# Patient Record
Sex: MALE | URBAN - METROPOLITAN AREA
[De-identification: names, ages, dates, MRNs, and addresses within clinical notes are randomized per-mention and may not be internally consistent; named-entity substitution may affect disease eponyms.]

---

## 2021-10-07 ENCOUNTER — OFFICE VISIT (OUTPATIENT)
Dept: URBAN - METROPOLITAN AREA CLINIC 98 | Facility: CLINIC | Age: 61
End: 2021-10-07

## 2021-10-07 NOTE — HPI-TODAY'S VISIT:
Colonoscopy was done May 2018.  Hemorrhoids were found and banding was performed. Additionally anal condyloma were found and patient was referred to colorectal surgery.

## 2021-11-02 ENCOUNTER — OFFICE VISIT (OUTPATIENT)
Dept: URBAN - METROPOLITAN AREA CLINIC 98 | Facility: CLINIC | Age: 61
End: 2021-11-02

## 2023-06-21 ENCOUNTER — OFFICE VISIT (OUTPATIENT)
Dept: URBAN - METROPOLITAN AREA CLINIC 27 | Facility: CLINIC | Age: 63
End: 2023-06-21

## 2024-12-11 ENCOUNTER — OFFICE VISIT (OUTPATIENT)
Dept: URBAN - METROPOLITAN AREA CLINIC 98 | Facility: CLINIC | Age: 64
End: 2024-12-11

## 2025-01-03 ENCOUNTER — LAB OUTSIDE AN ENCOUNTER (OUTPATIENT)
Dept: URBAN - METROPOLITAN AREA CLINIC 105 | Facility: CLINIC | Age: 65
End: 2025-01-03

## 2025-01-03 ENCOUNTER — OFFICE VISIT (OUTPATIENT)
Dept: URBAN - METROPOLITAN AREA CLINIC 105 | Facility: CLINIC | Age: 65
End: 2025-01-03
Payer: COMMERCIAL

## 2025-01-03 ENCOUNTER — DASHBOARD ENCOUNTERS (OUTPATIENT)
Age: 65
End: 2025-01-03

## 2025-01-03 ENCOUNTER — TELEPHONE ENCOUNTER (OUTPATIENT)
Dept: URBAN - METROPOLITAN AREA CLINIC 6 | Facility: CLINIC | Age: 65
End: 2025-01-03

## 2025-01-03 VITALS
SYSTOLIC BLOOD PRESSURE: 161 MMHG | BODY MASS INDEX: 26.51 KG/M2 | HEIGHT: 69 IN | DIASTOLIC BLOOD PRESSURE: 88 MMHG | TEMPERATURE: 96.8 F | HEART RATE: 72 BPM | WEIGHT: 179 LBS

## 2025-01-03 DIAGNOSIS — Z12.11 SCREEN FOR COLON CANCER: ICD-10-CM

## 2025-01-03 DIAGNOSIS — K80.20 GALLSTONES: ICD-10-CM

## 2025-01-03 DIAGNOSIS — A63.0 CONDYLOMA ACUMINATUM OF ANUS: ICD-10-CM

## 2025-01-03 PROBLEM — 235919008: Status: ACTIVE | Noted: 2025-01-03

## 2025-01-03 PROBLEM — 240597001: Status: ACTIVE | Noted: 2025-01-03

## 2025-01-03 PROCEDURE — 99204 OFFICE O/P NEW MOD 45 MIN: CPT | Performed by: INTERNAL MEDICINE

## 2025-01-03 RX ORDER — TIZANIDINE 4 MG/1
TABLET ORAL
Qty: 30 TABLET | Status: ACTIVE | COMMUNITY

## 2025-01-03 NOTE — HPI-TODAY'S VISIT:
1/3/25 63 yo male pt of DR Jose Jarvis.  Here to schedule colonoscopy Had anal condyloma with AIN 2016 and 2018 Has regular BMs. no blood in stool. no fhx of colon CA. does not have a CRS but thinks he has seen one in the past.  CT 6/2023 showed gallstones and some GB wall thickening and mild IHD . Says since then started eating beets and has no abd pain anymore. Used to have some abd discomfort RUQ prior to this. Says he has hemorrhoids but no symptoms at this time except some occ protrusion. Wants hemorrhoid banding for this reason.

## 2025-01-04 LAB
ALBUMIN/GLOBULIN RATIO: 1.3
ALBUMIN: 4.3
ALKALINE PHOSPHATASE: 119
ALT (SGPT): 47
AST (SGOT): 37
BILIRUBIN, DIRECT: 0.1
BILIRUBIN, INDIRECT: 0.6
BILIRUBIN, TOTAL: 0.7
GLOBULIN: 3.2
PROTEIN, TOTAL: 7.5

## 2025-01-06 ENCOUNTER — CLAIMS CREATED FROM THE CLAIM WINDOW (OUTPATIENT)
Dept: URBAN - METROPOLITAN AREA SURGERY CENTER 16 | Facility: SURGERY CENTER | Age: 65
End: 2025-01-06
Payer: COMMERCIAL

## 2025-01-06 ENCOUNTER — TELEPHONE ENCOUNTER (OUTPATIENT)
Dept: URBAN - METROPOLITAN AREA CLINIC 105 | Facility: CLINIC | Age: 65
End: 2025-01-06

## 2025-01-06 ENCOUNTER — LAB OUTSIDE AN ENCOUNTER (OUTPATIENT)
Dept: URBAN - METROPOLITAN AREA CLINIC 105 | Facility: CLINIC | Age: 65
End: 2025-01-06

## 2025-01-06 ENCOUNTER — OFFICE VISIT (OUTPATIENT)
Dept: URBAN - METROPOLITAN AREA SURGERY CENTER 16 | Facility: SURGERY CENTER | Age: 65
End: 2025-01-06

## 2025-01-06 DIAGNOSIS — Z12.11 COLON CANCER SCREENING: ICD-10-CM

## 2025-01-06 DIAGNOSIS — Z53.8 FAILED ATTEMPTED SURGICAL PROCEDURE: ICD-10-CM

## 2025-01-06 PROCEDURE — 00812 ANES LWR INTST SCR COLSC: CPT | Performed by: ANESTHESIOLOGY

## 2025-01-06 PROCEDURE — 00812 ANES LWR INTST SCR COLSC: CPT | Performed by: ANESTHESIOLOGIST ASSISTANT

## 2025-01-06 RX ORDER — TIZANIDINE 4 MG/1
TABLET ORAL
Qty: 30 TABLET | Status: ACTIVE | COMMUNITY

## 2025-01-15 ENCOUNTER — OFFICE VISIT (OUTPATIENT)
Dept: URBAN - METROPOLITAN AREA SURGERY CENTER 16 | Facility: SURGERY CENTER | Age: 65
End: 2025-01-15

## 2025-02-04 ENCOUNTER — TELEPHONE ENCOUNTER (OUTPATIENT)
Dept: URBAN - METROPOLITAN AREA CLINIC 23 | Facility: CLINIC | Age: 65
End: 2025-02-04

## 2025-02-05 ENCOUNTER — TELEPHONE ENCOUNTER (OUTPATIENT)
Dept: URBAN - METROPOLITAN AREA CLINIC 105 | Facility: CLINIC | Age: 65
End: 2025-02-05

## 2025-02-05 ENCOUNTER — OFFICE VISIT (OUTPATIENT)
Dept: URBAN - METROPOLITAN AREA SURGERY CENTER 16 | Facility: SURGERY CENTER | Age: 65
End: 2025-02-05

## 2025-02-05 RX ORDER — TIZANIDINE 4 MG/1
TABLET ORAL
Qty: 30 TABLET | Status: ACTIVE | COMMUNITY

## 2025-02-13 ENCOUNTER — OFFICE VISIT (OUTPATIENT)
Dept: URBAN - METROPOLITAN AREA SURGERY CENTER 16 | Facility: SURGERY CENTER | Age: 65
End: 2025-02-13

## 2025-02-19 ENCOUNTER — OFFICE VISIT (OUTPATIENT)
Dept: URBAN - METROPOLITAN AREA SURGERY CENTER 16 | Facility: SURGERY CENTER | Age: 65
End: 2025-02-19

## 2025-02-21 ENCOUNTER — OFFICE VISIT (OUTPATIENT)
Dept: URBAN - METROPOLITAN AREA CLINIC 105 | Facility: CLINIC | Age: 65
End: 2025-02-21

## 2025-02-21 RX ORDER — TIZANIDINE 4 MG/1
TABLET ORAL
Qty: 30 TABLET | Status: ACTIVE | COMMUNITY

## 2025-02-24 ENCOUNTER — TELEPHONE ENCOUNTER (OUTPATIENT)
Dept: URBAN - METROPOLITAN AREA CLINIC 105 | Facility: CLINIC | Age: 65
End: 2025-02-24

## 2025-04-18 ENCOUNTER — OFFICE VISIT (OUTPATIENT)
Dept: URBAN - METROPOLITAN AREA CLINIC 105 | Facility: CLINIC | Age: 65
End: 2025-04-18
Payer: COMMERCIAL

## 2025-04-18 ENCOUNTER — LAB OUTSIDE AN ENCOUNTER (OUTPATIENT)
Dept: URBAN - METROPOLITAN AREA CLINIC 105 | Facility: CLINIC | Age: 65
End: 2025-04-18

## 2025-04-18 DIAGNOSIS — A63.0 CONDYLOMA ACUMINATUM OF ANUS: ICD-10-CM

## 2025-04-18 DIAGNOSIS — Z12.11 SCREEN FOR COLON CANCER: ICD-10-CM

## 2025-04-18 DIAGNOSIS — R74.01 ELEVATED TRANSAMINASE LEVEL: ICD-10-CM

## 2025-04-18 DIAGNOSIS — K80.20 GALLSTONES: ICD-10-CM

## 2025-04-18 PROCEDURE — 99214 OFFICE O/P EST MOD 30 MIN: CPT | Performed by: INTERNAL MEDICINE

## 2025-04-18 RX ORDER — OXYCODONE AND ACETAMINOPHEN 5; 325 MG/1; MG/1
TABLET ORAL
Qty: 10 TABLET | Status: ACTIVE | COMMUNITY

## 2025-04-18 NOTE — HPI-TODAY'S VISIT:
1/3/25 63 yo male pt of DR Jose Jarvis.  Here to schedule colonoscopy Had anal condyloma with AIN 2016 and 2018 Has regular BMs. no blood in stool. no fhx of colon CA. does not have a CRS but thinks he has seen one in the past.  CT 6/2023 showed gallstones and some GB wall thickening and mild IHD . Says since then started eating beets and has no abd pain anymore. Used to have some abd discomfort RUQ prior to this. Says he has hemorrhoids but no symptoms at this time except some occ protrusion. Wants hemorrhoid banding for this reason.  4/18/25 Here for f.u Has seen Dr Roque Chang - had hemorrhoid banding.  US showing gallstones - says occ twinges in RUQ, persistently present. "its only been bad once a few years ago" Twinges are not necessarily worse with food.  Works out a lot but pain is not on the opposite Has regular BMs.  Poor prep at colonoscopy - ate chicken the day before.

## 2025-04-23 LAB
ACTIN (SMOOTH MUSCLE) ANTIBODY (IGG): <20
ALBUMIN/GLOBULIN RATIO: 1.2
ALBUMIN: 4
ALKALINE PHOSPHATASE: 110
ALPHA-1-ANTITRYPSIN QN: 155
ALT: 27
AST: 26
BILIRUBIN, DIRECT: 0.2
BILIRUBIN, INDIRECT: 0.6
BILIRUBIN, TOTAL: 0.8
CERULOPLASMIN: 36
ENHANCED LIVER FIBROSIS (ELF) SCORE: 9.43
FERRITIN, SERUM: 31
FIB 4 INDEX: 1.31
GLOBULIN: 3.4
HBSAG SCREEN: (no result)
HEP A AB, IGM: (no result)
HEP B CORE AB, IGM: (no result)
HEPATITIS C ANTIBODY: (no result)
IRON BIND.CAP.(TIBC): 341
IRON SATURATION: 26
IRON: 90
MITOCHONDRIAL AB SCREEN: NEGATIVE
PLATELET COUNT: 249
PROTEIN, TOTAL: 7.4

## 2025-05-06 ENCOUNTER — TELEPHONE ENCOUNTER (OUTPATIENT)
Dept: URBAN - METROPOLITAN AREA CLINIC 105 | Facility: CLINIC | Age: 65
End: 2025-05-06

## 2025-05-07 ENCOUNTER — OFFICE VISIT (OUTPATIENT)
Dept: URBAN - METROPOLITAN AREA SURGERY CENTER 16 | Facility: SURGERY CENTER | Age: 65
End: 2025-05-07

## 2025-05-16 ENCOUNTER — OFFICE VISIT (OUTPATIENT)
Dept: URBAN - METROPOLITAN AREA CLINIC 105 | Facility: CLINIC | Age: 65
End: 2025-05-16

## 2025-06-04 ENCOUNTER — OFFICE VISIT (OUTPATIENT)
Dept: URBAN - METROPOLITAN AREA CLINIC 105 | Facility: CLINIC | Age: 65
End: 2025-06-04
Payer: COMMERCIAL

## 2025-06-04 DIAGNOSIS — R74.01 ELEVATED TRANSAMINASE LEVEL: ICD-10-CM

## 2025-06-04 DIAGNOSIS — K80.20 GALLSTONES: ICD-10-CM

## 2025-06-04 DIAGNOSIS — A63.0 CONDYLOMA ACUMINATUM OF ANUS: ICD-10-CM

## 2025-06-04 DIAGNOSIS — Z12.11 SCREEN FOR COLON CANCER: ICD-10-CM

## 2025-06-04 PROCEDURE — 99213 OFFICE O/P EST LOW 20 MIN: CPT | Performed by: INTERNAL MEDICINE

## 2025-06-04 RX ORDER — OXYCODONE AND ACETAMINOPHEN 5; 325 MG/1; MG/1
TABLET ORAL
Qty: 10 TABLET | Status: ACTIVE | COMMUNITY

## 2025-06-04 NOTE — HPI-TODAY'S VISIT:
1/3/25 65 yo male pt of DR Jose Jarvis.  Here to schedule colonoscopy Had anal condyloma with AIN 2016 and 2018 Has regular BMs. no blood in stool. no fhx of colon CA. does not have a CRS but thinks he has seen one in the past.  CT 6/2023 showed gallstones and some GB wall thickening and mild IHD . Says since then started eating beets and has no abd pain anymore. Used to have some abd discomfort RUQ prior to this. Says he has hemorrhoids but no symptoms at this time except some occ protrusion. Wants hemorrhoid banding for this reason.  4/18/25 Here for f.u Has seen Dr Roque Chang - had hemorrhoid banding.  US showing gallstones - says occ twinges in RUQ, persistently present. "its only been bad once a few years ago" Twinges are not necessarily worse with food.  Works out a lot but pain is not on the opposite Has regular BMs.  Poor prep at colonoscopy - ate chicken the day before.  6/4/25 Saw Dr Chang - neg AIN but to repeat pap yearly. Colonoscopy with Dr Chang 5/15/25 good prep recommended repeat in 5 years for surveillance. S/p hemorrhoid banding.  US showed gallstones - discussed.